# Patient Record
Sex: FEMALE | Race: WHITE | Employment: STUDENT | URBAN - METROPOLITAN AREA
[De-identification: names, ages, dates, MRNs, and addresses within clinical notes are randomized per-mention and may not be internally consistent; named-entity substitution may affect disease eponyms.]

---

## 2019-03-12 ENCOUNTER — HOSPITAL ENCOUNTER (OUTPATIENT)
Dept: PHYSICAL THERAPY | Age: 19
Setting detail: THERAPIES SERIES
Discharge: HOME | End: 2019-03-12
Payer: COMMERCIAL

## 2019-03-12 PROCEDURE — 97161 PT EVAL LOW COMPLEX 20 MIN: CPT

## 2019-03-12 PROCEDURE — 97110 THERAPEUTIC EXERCISES: CPT

## 2019-03-15 ENCOUNTER — HOSPITAL ENCOUNTER (OUTPATIENT)
Dept: PHYSICAL THERAPY | Age: 19
Setting detail: THERAPIES SERIES
Discharge: HOME | End: 2019-03-15
Payer: COMMERCIAL

## 2019-03-15 PROCEDURE — 97140 MANUAL THERAPY 1/> REGIONS: CPT

## 2019-03-15 PROCEDURE — 97110 THERAPEUTIC EXERCISES: CPT

## 2019-03-15 PROCEDURE — 97112 NEUROMUSCULAR REEDUCATION: CPT

## 2019-04-09 ENCOUNTER — HOSPITAL ENCOUNTER (OUTPATIENT)
Dept: PHYSICAL THERAPY | Age: 19
Setting detail: THERAPIES SERIES
Discharge: HOME | End: 2019-04-09
Payer: COMMERCIAL

## 2019-04-09 PROCEDURE — 97110 THERAPEUTIC EXERCISES: CPT

## 2019-04-09 PROCEDURE — 97112 NEUROMUSCULAR REEDUCATION: CPT

## 2019-04-09 PROCEDURE — 97140 MANUAL THERAPY 1/> REGIONS: CPT

## 2019-04-09 NOTE — FLOWSHEET NOTE
[facility] [address] [address]  Phone: [phone] Fax: [phone]    Physical Therapy Daily Treatment Note  Date:  2019    Patient Name:  Alcon Zheng"   :  2000  MRN: [id number]  Restrictions/Precautions:    Medical/Treatment Diagnosis Information:  · Diagnosis: L ITBFS  · Treatment Diagnosis: Y75.082  Insurance/Certification information:  PT Insurance Information: [employer]/[place]  Physician Information:  Referring Practitioner: Smith Byrne of care signed (Y/N):     Date of Patient follow up with Physician:     G-Code (if applicable):      Date G-Code Applied:         Progress Note: [x]  Yes  []  No  Next due by: Visit #10       Latex Allergy:  [x]NO      []YES  Preferred Language for Healthcare:   [x]English       []other:    Visit # Insurance Allowable   3 MU     Pain level:  3/10     SUBJECTIVE:  Did well but games this weekend made it sore again. Both lateral quads and ITB.      OBJECTIVE: See eval  Observation:   Test measurements:      RESTRICTIONS/PRECAUTIONS:     Exercises/Interventions:     Therapeutic Ex (29857) 15 Sets/sec Reps Notes/CUES   Retro Stepper/BIKE      Alter G      BFR      Sportcord March   Slow- control in SLS- lat   3 way SLR      SAQ      Clam ABD 2 15    Hip Ext /table  15    BOSU fwd/side lunge   shaky   BOSU squat      Leg Press Iso/Con/Ecc 0-      Cybex HS curl      TKE      Glute side walks 3 15 maroon   RDL 2 12 Green kb, 1 set each leg   Slide Lunge      Slide HS eccentrics      Step ups/ecc step down      Swissball wall rolls- in SLS- hip drive      Quad hip ext/wall-ball rolls      pigeon 2 15          Manual Intervention (96674) x 16 min      Knee mobs/PROM      Tib/Fem Mobs      Patella Mobs      Quad stretching  6    ITB mobs  10          NMR re-education (21718) x 9 min   CUES NEEDED   Palestinian/Biofeedback 10/10      BFR      G. Med activation 5s 10 Standing @ wall         Bosu Bal and Prop- G Med      Single leg stance/Balance/Prop      Bosu Retro G. Med act 10 10    Prone Hip froggers- sliders/elevated 10 10          Therapeutic Activity (14368)      Ladders      Plyos      Dynamic Balance                            Therapeutic Exercise and NMR EXR  [x] (02297) Provided verbal/tactile cueing for activities related to strengthening, flexibility, endurance, ROM for improvements in LE, proximal hip, and core control with self care, mobility, lifting, ambulation. [x] (65825) Provided verbal/tactile cueing for activities related to improving balance, coordination, kinesthetic sense, posture, motor skill, proprioception  to assist with LE, proximal hip, and core control in self care, mobility, lifting, ambulation and eccentric single leg control.      NMR and Therapeutic Activities:    [] (09361 or 04855) Provided verbal/tactile cueing for activities related to improving balance, coordination, kinesthetic sense, posture, motor skill, proprioception and motor activation to allow for proper function of core, proximal hip and LE with self care and ADLs and functional mobility.   [] (70695) Gait Re-education- Provided training and instruction to the patient for proper LE, core and proximal hip recruitment and positioning and eccentric body weight control with ambulation re-education including up and down stairs     Home Exercise Program:    [x] (31850) Reviewed/Progressed HEP activities related to strengthening, flexibility, endurance, ROM of core, proximal hip and LE for functional self-care, mobility, lifting and ambulation/stair navigation   [] (70862)Reviewed/Progressed HEP activities related to improving balance, coordination, kinesthetic sense, posture, motor skill, proprioception of core, proximal hip and LE for self care, mobility, lifting, and ambulation/stair navigation      Manual Treatments:  PROM / STM / Oscillations-Mobs:  G-I, II, III, IV (PA's, Inf., Post.)  [x] progressing as expected towards functional goals listed. [] Progression is slowed due to complexities listed. [] Progression has been slowed due to co-morbidities. [] Plan just implemented, too soon to assess goals progression  [] Other:     ASSESSMENT:  Significant tenderness in B lateral quad and ITB. Return to Play: (if applicable)   []  Stage 1: Intro to Strength   []  Stage 2: Return to Run and Strength   []  Stage 3: Return to Jump and Strength   []  Stage 4: Dynamic Strength and Agility   []  Stage 5: Sport Specific Training     []  Ready to Return to Play, Meets All Above Stages   []  Not Ready for Return to Sports   Comments:                         Treatment/Activity Tolerance:  [x] Patient tolerated treatment well [] Patient limited by fatique  [] Patient limited by pain  [] Patient limited by other medical complications  [] Other:     Prognosis: [x] Good [] Fair  [] Poor    Patient Requires Follow-up: [x] Yes  [] No      PLAN: Manual prior to games.    [x] Continue per plan of care [] Alter current plan (see comments)  [] Plan of care initiated [] Hold pending MD visit [] Discharge    Electronically signed by: Puneet Keys PT

## 2019-04-12 ENCOUNTER — HOSPITAL ENCOUNTER (OUTPATIENT)
Dept: PHYSICAL THERAPY | Age: 19
Setting detail: THERAPIES SERIES
Discharge: HOME | End: 2019-04-12
Payer: COMMERCIAL

## 2019-04-12 PROCEDURE — 97140 MANUAL THERAPY 1/> REGIONS: CPT

## 2019-04-12 PROCEDURE — 97110 THERAPEUTIC EXERCISES: CPT

## 2019-04-12 NOTE — FLOWSHEET NOTE
Bosu Bal and Prop- G Med      Single leg stance/Balance/Prop      Bosu Retro G. Med act 10 10    Prone Hip froggers- sliders/elevated 10 10          Therapeutic Activity (15383)      Ladders      Plyos      Dynamic Balance                            Therapeutic Exercise and NMR EXR  [x] (92829) Provided verbal/tactile cueing for activities related to strengthening, flexibility, endurance, ROM for improvements in LE, proximal hip, and core control with self care, mobility, lifting, ambulation. [x] (38051) Provided verbal/tactile cueing for activities related to improving balance, coordination, kinesthetic sense, posture, motor skill, proprioception  to assist with LE, proximal hip, and core control in self care, mobility, lifting, ambulation and eccentric single leg control.      NMR and Therapeutic Activities:    [] (97873 or 98505) Provided verbal/tactile cueing for activities related to improving balance, coordination, kinesthetic sense, posture, motor skill, proprioception and motor activation to allow for proper function of core, proximal hip and LE with self care and ADLs and functional mobility.   [] (64817) Gait Re-education- Provided training and instruction to the patient for proper LE, core and proximal hip recruitment and positioning and eccentric body weight control with ambulation re-education including up and down stairs     Home Exercise Program:    [x] (66082) Reviewed/Progressed HEP activities related to strengthening, flexibility, endurance, ROM of core, proximal hip and LE for functional self-care, mobility, lifting and ambulation/stair navigation   [] (29519)Reviewed/Progressed HEP activities related to improving balance, coordination, kinesthetic sense, posture, motor skill, proprioception of core, proximal hip and LE for self care, mobility, lifting, and ambulation/stair navigation      Manual Treatments:  PROM / STM / Oscillations-Mobs:  G-I, II, III, IV (PA's, Inf., Post.)  [x] (35325) Provided manual therapy to mobilize LE, proximal hip and/or LS spine soft tissue/joints for the purpose of modulating pain, promoting relaxation,  increasing ROM, reducing/eliminating soft tissue swelling/inflammation/restriction, improving soft tissue extensibility and allowing for proper ROM for normal function with self care, mobility, lifting and ambulation. Modalities:     [] GAME READY (VASO)- for significant edema, swelling, pain control. Charges:  Timed Code Treatment Minutes: 31   Total Treatment Minutes: 31      [] EVAL (LOW) 55266 (typically 20 minutes face-to-face)  [] EVAL (MOD) 98345 (typically 30 minutes face-to-face)  [] EVAL (HIGH) 24220 (typically 45 minutes face-to-face)  [] RE-EVAL      [x] RT(80417) x  1   [] IONTO  [] NMR (66489) x      [] VASO  [x] Manual (97454) x  1    [] Other:  [] TA x       [] Mech Traction (11398)  [] ES(attended) (26565)      [] ES (un) (44224):     GOALS:  Patient stated goal: pain free FH    Therapist goals for Patient:   Short Term Goals: To be achieved in: 2 weeks  1. Independent in HEP and progression per patient tolerance, in order to prevent re-injury. 2. Patient will have a decrease in pain to facilitate improvement in movement, function, and ADLs as indicated by Functional Deficits. Long Term Goals: To be achieved in: 4 weeks  1. Disability index score of 2% or less for the LEFS to assist with reaching prior level of function. 2. Patient will demonstrate increased AROM to WNL to allow for proper joint functioning as indicated by patients Functional Deficits. 3. Patient will demonstrate an increase in Strength to good proximal hip strength and control, within 5lb HHD in LE to allow for proper functional mobility as indicated by patients Functional Deficits. 4. Patient will return to single leg, eccentric functional activities without increased symptoms or restriction.        Progression Towards Functional goals:  [x] Patient is progressing as expected towards functional goals listed. [] Progression is slowed due to complexities listed. [] Progression has been slowed due to co-morbidities. [] Plan just implemented, too soon to assess goals progression  [] Other:     ASSESSMENT:  Significant tenderness in HS, lateral quad and ITB. Better after manual. May have slight LS component. Return to Play: (if applicable)   []  Stage 1: Intro to Strength   []  Stage 2: Return to Run and Strength   []  Stage 3: Return to Jump and Strength   []  Stage 4: Dynamic Strength and Agility   []  Stage 5: Sport Specific Training     []  Ready to Return to Play, Meets All Above Stages   []  Not Ready for Return to Sports   Comments:                         Treatment/Activity Tolerance:  [x] Patient tolerated treatment well [] Patient limited by fatique  [] Patient limited by pain  [] Patient limited by other medical complications  [] Other:     Prognosis: [x] Good [] Fair  [] Poor    Patient Requires Follow-up: [x] Yes  [] No      PLAN: Manual prior to games.    [x] Continue per plan of care [] Alter current plan (see comments)  [] Plan of care initiated [] Hold pending MD visit [] Discharge    Electronically signed by: Jenn Ceballos, PT

## 2019-04-15 ENCOUNTER — APPOINTMENT (OUTPATIENT)
Dept: PHYSICAL THERAPY | Age: 19
End: 2019-04-15
Payer: COMMERCIAL

## 2019-04-16 ENCOUNTER — HOSPITAL ENCOUNTER (OUTPATIENT)
Dept: PHYSICAL THERAPY | Age: 19
Setting detail: THERAPIES SERIES
Discharge: HOME | End: 2019-04-16
Payer: COMMERCIAL

## 2019-04-16 PROCEDURE — 97110 THERAPEUTIC EXERCISES: CPT

## 2019-04-16 PROCEDURE — 97140 MANUAL THERAPY 1/> REGIONS: CPT

## 2019-04-16 PROCEDURE — 97032 APPL MODALITY 1+ESTIM EA 15: CPT

## 2019-04-16 PROCEDURE — 97112 NEUROMUSCULAR REEDUCATION: CPT

## 2019-04-16 NOTE — FLOWSHEET NOTE
activation 5s 10 Standing @ wall         Bosu Bal and Prop- G Med      Single leg stance/Balance/Prop      Bosu Retro G. Med act 10 10    Prone Hip froggers- sliders/elevated 10 10          Therapeutic Activity (31750)      Ladders      Plyos      Dynamic Balance                            Therapeutic Exercise and NMR EXR  [x] (11091) Provided verbal/tactile cueing for activities related to strengthening, flexibility, endurance, ROM for improvements in LE, proximal hip, and core control with self care, mobility, lifting, ambulation. [x] (64683) Provided verbal/tactile cueing for activities related to improving balance, coordination, kinesthetic sense, posture, motor skill, proprioception  to assist with LE, proximal hip, and core control in self care, mobility, lifting, ambulation and eccentric single leg control.      NMR and Therapeutic Activities:    [] (61976 or 35952) Provided verbal/tactile cueing for activities related to improving balance, coordination, kinesthetic sense, posture, motor skill, proprioception and motor activation to allow for proper function of core, proximal hip and LE with self care and ADLs and functional mobility.   [] (35832) Gait Re-education- Provided training and instruction to the patient for proper LE, core and proximal hip recruitment and positioning and eccentric body weight control with ambulation re-education including up and down stairs     Home Exercise Program:    [x] (53493) Reviewed/Progressed HEP activities related to strengthening, flexibility, endurance, ROM of core, proximal hip and LE for functional self-care, mobility, lifting and ambulation/stair navigation   [] (85153)Reviewed/Progressed HEP activities related to improving balance, coordination, kinesthetic sense, posture, motor skill, proprioception of core, proximal hip and LE for self care, mobility, lifting, and ambulation/stair navigation      Manual Treatments:  PROM / STM / Oscillations-Mobs:  G-I, II, III, IV (PA's, Inf., Post.)  [x] (15596) Provided manual therapy to mobilize LE, proximal hip and/or LS spine soft tissue/joints for the purpose of modulating pain, promoting relaxation,  increasing ROM, reducing/eliminating soft tissue swelling/inflammation/restriction, improving soft tissue extensibility and allowing for proper ROM for normal function with self care, mobility, lifting and ambulation. Spoke with   regarding the use of Dry Needling     Dry needling manual therapy: consisted on the placement of 5 needles in the following muscles:  lat quad, ITB. A 50 mm needle was inserted, piston, rotated, and coned to produce intramuscular mobilization. These techniques were used to restore functional range of motion, reduce muscle spasm and induce healing in the corresponding musculature. (09691)  Clean Technique was utilized today while applying Dry needling treatment. The treatment sites where cleaned with 70% solution of  isopropyl alcohol . The PT washed their hands and utilized treatment gloves along with hand  prior to inserting the needles. All needles where removed and discarded in the appropriate sharps container. MD has given verbal and/or written approval for this treatment. Attended low frequency (1-20Hz) electrical stimulation was utilized in conjunction with Dry Needling:  the Estim was manipulated between all above needles for a period of 10 min. at 4 volts. The low frequency electrical stimulation was used to help reduce muscle spasm and help to interrupt /Milton the pain cycle. (03363)     Modalities:     [] GAME READY (VASO)- for significant edema, swelling, pain control.      Charges:  Timed Code Treatment Minutes: 48   Total Treatment Minutes: 48      [] EVAL (LOW) 88205 (typically 20 minutes face-to-face)  [] EVAL (MOD) 66908 (typically 30 minutes face-to-face)  [] EVAL (HIGH) 77195 (typically 45 minutes face-to-face)  [] RE-EVAL      [x] PH(81347) x  1   [] IONTO  [x]

## 2019-04-18 ENCOUNTER — HOSPITAL ENCOUNTER (OUTPATIENT)
Dept: PHYSICAL THERAPY | Age: 19
Setting detail: THERAPIES SERIES
Discharge: HOME | End: 2019-04-18
Payer: COMMERCIAL

## 2019-04-18 PROCEDURE — 97112 NEUROMUSCULAR REEDUCATION: CPT

## 2019-04-18 PROCEDURE — 97140 MANUAL THERAPY 1/> REGIONS: CPT

## 2019-04-18 PROCEDURE — 97110 THERAPEUTIC EXERCISES: CPT

## 2019-04-18 NOTE — FLOWSHEET NOTE
5s 10 Standing @ wall         Bosu Bal and Prop- G Med      Single leg stance/Balance/Prop      Bosu Retro G. Med act 10 10    Prone Hip froggers- sliders/elevated 10 10          Therapeutic Activity (15452)      Ladders      Plyos      Dynamic Balance                            Therapeutic Exercise and NMR EXR  [x] (16231) Provided verbal/tactile cueing for activities related to strengthening, flexibility, endurance, ROM for improvements in LE, proximal hip, and core control with self care, mobility, lifting, ambulation. [x] (77991) Provided verbal/tactile cueing for activities related to improving balance, coordination, kinesthetic sense, posture, motor skill, proprioception  to assist with LE, proximal hip, and core control in self care, mobility, lifting, ambulation and eccentric single leg control.      NMR and Therapeutic Activities:    [] (53465 or 76857) Provided verbal/tactile cueing for activities related to improving balance, coordination, kinesthetic sense, posture, motor skill, proprioception and motor activation to allow for proper function of core, proximal hip and LE with self care and ADLs and functional mobility.   [] (03323) Gait Re-education- Provided training and instruction to the patient for proper LE, core and proximal hip recruitment and positioning and eccentric body weight control with ambulation re-education including up and down stairs     Home Exercise Program:    [x] (31491) Reviewed/Progressed HEP activities related to strengthening, flexibility, endurance, ROM of core, proximal hip and LE for functional self-care, mobility, lifting and ambulation/stair navigation   [] (84944)Reviewed/Progressed HEP activities related to improving balance, coordination, kinesthetic sense, posture, motor skill, proprioception of core, proximal hip and LE for self care, mobility, lifting, and ambulation/stair navigation      Manual Treatments:  PROM / STM / Oscillations-Mobs:  G-I, II, III, IV (PA's, Patient is progressing as expected towards functional goals listed. [] Progression is slowed due to complexities listed. [] Progression has been slowed due to co-morbidities. [] Plan just implemented, too soon to assess goals progression  [] Other:     ASSESSMENT:  Continue core ex, prox hip ex. Return to Play: (if applicable)   []  Stage 1: Intro to Strength   []  Stage 2: Return to Run and Strength   []  Stage 3: Return to Jump and Strength   []  Stage 4: Dynamic Strength and Agility   []  Stage 5: Sport Specific Training     []  Ready to Return to Play, Meets All Above Stages   []  Not Ready for Return to Sports   Comments:                         Treatment/Activity Tolerance:  [x] Patient tolerated treatment well [] Patient limited by fatique  [] Patient limited by pain  [] Patient limited by other medical complications  [] Other:     Prognosis: [x] Good [] Fair  [] Poor    Patient Requires Follow-up: [x] Yes  [] No      PLAN: Manual prior to games, continue focus on glute/prox hip.    [x] Continue per plan of care [] Alter current plan (see comments)  [] Plan of care initiated [] Hold pending MD visit [] Discharge    Electronically signed by: Rainell Hodgkin, PT

## 2019-08-13 ENCOUNTER — NURSE ONLY (OUTPATIENT)
Dept: CARDIOLOGY CLINIC | Age: 19
End: 2019-08-13
Payer: COMMERCIAL

## 2019-08-13 DIAGNOSIS — Z02.5 SPORTS PHYSICAL: Primary | ICD-10-CM

## 2019-08-13 PROCEDURE — 93000 ELECTROCARDIOGRAM COMPLETE: CPT | Performed by: INTERNAL MEDICINE

## 2019-12-03 ENCOUNTER — OFFICE VISIT (OUTPATIENT)
Dept: ORTHOPEDIC SURGERY | Age: 19
End: 2019-12-03
Payer: COMMERCIAL

## 2019-12-03 ENCOUNTER — HOSPITAL ENCOUNTER (OUTPATIENT)
Dept: OCCUPATIONAL THERAPY | Age: 19
Setting detail: THERAPIES SERIES
Discharge: HOME | End: 2019-12-03
Payer: COMMERCIAL

## 2019-12-03 VITALS — BODY MASS INDEX: 19.62 KG/M2 | HEIGHT: 67 IN | WEIGHT: 125 LBS

## 2019-12-03 DIAGNOSIS — S62.620A CLOSED DISPLACED FRACTURE OF MIDDLE PHALANX OF RIGHT INDEX FINGER, INITIAL ENCOUNTER: ICD-10-CM

## 2019-12-03 DIAGNOSIS — S69.91XA FINGER INJURY, RIGHT, INITIAL ENCOUNTER: Primary | ICD-10-CM

## 2019-12-03 PROCEDURE — L3933 FO W/O JOINTS CF: HCPCS | Performed by: OCCUPATIONAL THERAPIST

## 2019-12-03 PROCEDURE — 99203 OFFICE O/P NEW LOW 30 MIN: CPT | Performed by: ORTHOPAEDIC SURGERY

## 2019-12-03 RX ORDER — TRIAMCINOLONE ACETONIDE 55 UG/1
2 SPRAY, METERED NASAL
COMMUNITY

## 2019-12-03 RX ORDER — MONTELUKAST SODIUM 10 MG/1
TABLET ORAL
Refills: 4 | COMMUNITY
Start: 2019-11-14

## 2019-12-03 RX ORDER — LEVALBUTEROL TARTRATE 45 UG/1
1-2 AEROSOL, METERED ORAL
COMMUNITY

## 2019-12-12 ENCOUNTER — OFFICE VISIT (OUTPATIENT)
Dept: ORTHOPEDIC SURGERY | Age: 19
End: 2019-12-12
Payer: COMMERCIAL

## 2019-12-12 VITALS — WEIGHT: 125 LBS | HEIGHT: 67 IN | BODY MASS INDEX: 19.62 KG/M2

## 2019-12-12 DIAGNOSIS — S62.620A CLOSED DISPLACED FRACTURE OF MIDDLE PHALANX OF RIGHT INDEX FINGER, INITIAL ENCOUNTER: Primary | ICD-10-CM

## 2019-12-12 DIAGNOSIS — S69.91XA FINGER INJURY, RIGHT, INITIAL ENCOUNTER: ICD-10-CM

## 2019-12-12 PROCEDURE — 99213 OFFICE O/P EST LOW 20 MIN: CPT | Performed by: ORTHOPAEDIC SURGERY

## 2021-11-15 ENCOUNTER — OFFICE VISIT (OUTPATIENT)
Dept: INTERNAL MEDICINE | Facility: CLINIC | Age: 21
End: 2021-11-15

## 2021-11-15 VITALS
TEMPERATURE: 98 F | OXYGEN SATURATION: 100 % | DIASTOLIC BLOOD PRESSURE: 68 MMHG | BODY MASS INDEX: 19.13 KG/M2 | HEART RATE: 76 BPM | WEIGHT: 121.9 LBS | HEIGHT: 67 IN | SYSTOLIC BLOOD PRESSURE: 114 MMHG

## 2021-11-15 DIAGNOSIS — Z23 NEED FOR INFLUENZA VACCINATION: ICD-10-CM

## 2021-11-15 DIAGNOSIS — Z00.00 HEALTH CARE MAINTENANCE: Primary | ICD-10-CM

## 2021-11-15 DIAGNOSIS — F41.9 ANXIETY: ICD-10-CM

## 2021-11-15 PROCEDURE — 90686 IIV4 VACC NO PRSV 0.5 ML IM: CPT | Performed by: INTERNAL MEDICINE

## 2021-11-15 PROCEDURE — 99385 PREV VISIT NEW AGE 18-39: CPT | Performed by: INTERNAL MEDICINE

## 2021-11-15 PROCEDURE — 90471 IMMUNIZATION ADMIN: CPT | Performed by: INTERNAL MEDICINE

## 2021-11-15 RX ORDER — LEVALBUTEROL TARTRATE 45 UG/1
1-2 AEROSOL, METERED ORAL DAILY PRN
COMMUNITY

## 2021-11-15 RX ORDER — TRIAMCINOLONE ACETONIDE 55 UG/1
2 SPRAY, METERED NASAL DAILY
COMMUNITY

## 2021-11-15 RX ORDER — MOMETASONE FUROATE 50 UG/1
2 SPRAY, METERED NASAL DAILY
COMMUNITY
End: 2021-11-15

## 2021-11-15 RX ORDER — HYDROXYZINE HYDROCHLORIDE 10 MG/1
10 TABLET, FILM COATED ORAL 3 TIMES DAILY PRN
Qty: 30 TABLET | Refills: 0 | Status: SHIPPED | OUTPATIENT
Start: 2021-11-15

## 2021-11-15 RX ORDER — DIPHENOXYLATE HYDROCHLORIDE AND ATROPINE SULFATE 2.5; .025 MG/1; MG/1
1 TABLET ORAL DAILY
COMMUNITY
End: 2022-11-17

## 2021-11-15 NOTE — PROGRESS NOTES
"Jessica Barone is a 21 y.o. female and is here for a comprehensive physical exam. The patient reports problems - wellcontrolled asthma.    Pt is UTD with annual gyn exam and mammo make a referral      Do you take any herbs or supplements that were not prescribed by a doctor?       Social History: she does exercise reg  Social History     Socioeconomic History   • Marital status: Single   Tobacco Use   • Smoking status: Never Smoker   • Smokeless tobacco: Never Used   Substance and Sexual Activity   • Alcohol use: Yes     Comment: social       Family History: No family history on file.    Past Medical History:   Past Medical History:   Diagnosis Date   • Asthma            Review of Systems    A comprehensive review of systems was negative.    Objective   /68 (BP Location: Left arm, Patient Position: Sitting)   Pulse 76   Temp 98 °F (36.7 °C) (Infrared)   Ht 170.2 cm (67\")   Wt 55.3 kg (121 lb 14.4 oz)   SpO2 100%   BMI 19.09 kg/m²     General Appearance:    Alert, cooperative, no distress, appears stated age   Head:    Normocephalic, without obvious abnormality, atraumatic   Eyes:    PERRL, conjunctiva/corneas clear, EOM's intact, fundi     benign, both eyes   Ears:    Normal TM's and external ear canals, both ears   Nose:   Nares normal, septum midline, mucosa normal, no drainage    or sinus tenderness   Throat:   Lips, mucosa, and tongue normal; teeth and gums normal   Neck:   Supple, symmetrical, trachea midline, no adenopathy;     thyroid:  no enlargement/tenderness/nodules; no carotid    bruit or JVD   Back:     Symmetric, no curvature, ROM normal, no CVA tenderness   Lungs:     Clear to auscultation bilaterally, respirations unlabored   Chest Wall:    No tenderness or deformity    Heart:    Regular rate and rhythm, S1 and S2 normal, no murmur, rub   or gallop   Breast Exam:    No tenderness, masses, or nipple abnormality   Abdomen:     Soft, non-tender, bowel sounds active all four " quadrants,     no masses, no organomegaly           Extremities:   Extremities normal, atraumatic, no cyanosis or edema   Pulses:   2+ and symmetric all extremities   Skin:   Skin color, texture, turgor normal, no rashes or lesions   Lymph nodes:   Cervical, supraclavicular, and axillary nodes normal   Neurologic:   CNII-XII intact, normal strength, sensation and reflexes     throughout       Vitals:    11/15/21 0811   BP: 114/68   Pulse: 76   Temp: 98 °F (36.7 °C)   SpO2: 100%     Body mass index is 19.09 kg/m².      Medications:   Current Outpatient Medications:   •  beclomethasone (QVAR) 80 MCG/ACT inhaler, Inhale 1 puff Daily As Needed., Disp: , Rfl:   •  levalbuterol (XOPENEX HFA) 45 MCG/ACT inhaler, Inhale 1-2 puffs Daily As Needed., Disp: , Rfl:   •  multivitamin (multivitamin) tablet tablet, Take 1 tablet by mouth Daily., Disp: , Rfl:   •  Triamcinolone Acetonide (NASACORT) 55 MCG/ACT nasal inhaler, 2 sprays into the nostril(s) as directed by provider Daily., Disp: , Rfl:        Assessment/Plan   Healthy female exam.      1. Healthcare Maintenance:  2. Patient Counseling:  --Nutrition: Stressed importance of moderation in sodium/caffeine intake, saturated fat and cholesterol, caloric balance, sufficient intake of fresh fruits, vegetables, fiber, calcium and vit D  --Exercise: she does exercise  --Substance Abuse: no tob occas etoh  --Dental health: she does go to the dentist reg  --Immunizations reviewed.utd  --Discussed benefits of screening colonoscopy.    3.  Exercise induced asthma-  Se is stable with prn use

## 2021-11-16 LAB
25(OH)D3+25(OH)D2 SERPL-MCNC: 39 NG/ML (ref 30–100)
ALBUMIN SERPL-MCNC: 4.9 G/DL (ref 3.9–5)
ALBUMIN/GLOB SERPL: 1.8 {RATIO} (ref 1.2–2.2)
ALP SERPL-CCNC: 36 IU/L (ref 44–121)
ALT SERPL-CCNC: 10 IU/L (ref 0–32)
AST SERPL-CCNC: 19 IU/L (ref 0–40)
BASOPHILS # BLD AUTO: 0 X10E3/UL (ref 0–0.2)
BASOPHILS NFR BLD AUTO: 1 %
BILIRUB SERPL-MCNC: 0.6 MG/DL (ref 0–1.2)
BUN SERPL-MCNC: 10 MG/DL (ref 6–20)
BUN/CREAT SERPL: 14 (ref 9–23)
CALCIUM SERPL-MCNC: 9.9 MG/DL (ref 8.7–10.2)
CHLORIDE SERPL-SCNC: 102 MMOL/L (ref 96–106)
CHOLEST SERPL-MCNC: 143 MG/DL (ref 100–199)
CO2 SERPL-SCNC: 22 MMOL/L (ref 20–29)
CREAT SERPL-MCNC: 0.7 MG/DL (ref 0.57–1)
EOSINOPHIL # BLD AUTO: 0 X10E3/UL (ref 0–0.4)
EOSINOPHIL NFR BLD AUTO: 1 %
ERYTHROCYTE [DISTWIDTH] IN BLOOD BY AUTOMATED COUNT: 11.8 % (ref 11.7–15.4)
GLOBULIN SER CALC-MCNC: 2.7 G/DL (ref 1.5–4.5)
GLUCOSE SERPL-MCNC: 91 MG/DL (ref 65–99)
HCT VFR BLD AUTO: 39.3 % (ref 34–46.6)
HCV AB S/CO SERPL IA: <0.1 S/CO RATIO (ref 0–0.9)
HDLC SERPL-MCNC: 59 MG/DL
HGB BLD-MCNC: 13.1 G/DL (ref 11.1–15.9)
IMM GRANULOCYTES # BLD AUTO: 0 X10E3/UL (ref 0–0.1)
IMM GRANULOCYTES NFR BLD AUTO: 0 %
LDLC SERPL CALC-MCNC: 72 MG/DL (ref 0–99)
LDLC/HDLC SERPL: 1.2 RATIO (ref 0–3.2)
LYMPHOCYTES # BLD AUTO: 1.3 X10E3/UL (ref 0.7–3.1)
LYMPHOCYTES NFR BLD AUTO: 33 %
MCH RBC QN AUTO: 28.8 PG (ref 26.6–33)
MCHC RBC AUTO-ENTMCNC: 33.3 G/DL (ref 31.5–35.7)
MCV RBC AUTO: 86 FL (ref 79–97)
MONOCYTES # BLD AUTO: 0.5 X10E3/UL (ref 0.1–0.9)
MONOCYTES NFR BLD AUTO: 12 %
NEUTROPHILS # BLD AUTO: 2.2 X10E3/UL (ref 1.4–7)
NEUTROPHILS NFR BLD AUTO: 53 %
PLATELET # BLD AUTO: 241 X10E3/UL (ref 150–450)
POTASSIUM SERPL-SCNC: 4.2 MMOL/L (ref 3.5–5.2)
PROT SERPL-MCNC: 7.6 G/DL (ref 6–8.5)
RBC # BLD AUTO: 4.55 X10E6/UL (ref 3.77–5.28)
SODIUM SERPL-SCNC: 139 MMOL/L (ref 134–144)
TRIGL SERPL-MCNC: 55 MG/DL (ref 0–149)
TSH SERPL DL<=0.005 MIU/L-ACNC: 3.94 UIU/ML (ref 0.45–4.5)
VLDLC SERPL CALC-MCNC: 12 MG/DL (ref 5–40)
WBC # BLD AUTO: 4 X10E3/UL (ref 3.4–10.8)

## 2022-01-10 ENCOUNTER — TELEPHONE (OUTPATIENT)
Dept: INTERNAL MEDICINE | Facility: CLINIC | Age: 22
End: 2022-01-10

## 2022-01-10 NOTE — TELEPHONE ENCOUNTER
Caller: Charmaine Barone    Relationship: Self    Best call back number: [phone] (H)    What is the best time to reach you: AFTER LUNCH 11-1:30 PM    Who are you requesting to speak with (clinical staff, provider,  specific staff member): CLINICAL STAFF    Do you know the name of the person who called:     What was the call regarding: HAS SOME QUESTIONS ABOUT COVID- HOW LONG WILL BE CONTAGIOUS- IS THERE ANY MEDICATION TO SHORTEN THE VIRUS IN HER BODY.     Do you require a callback: YES        THANKS

## 2022-01-10 NOTE — TELEPHONE ENCOUNTER
Pt advised to quarantine for 10 days of onset of symtpoms. Is anything becomes uncontrollable to call for an appt or seek help at the ER

## 2022-05-19 ENCOUNTER — PATIENT ROUNDING (BHMG ONLY) (OUTPATIENT)
Dept: OBSTETRICS AND GYNECOLOGY | Age: 22
End: 2022-05-19

## 2022-05-19 ENCOUNTER — OFFICE VISIT (OUTPATIENT)
Dept: OBSTETRICS AND GYNECOLOGY | Age: 22
End: 2022-05-19

## 2022-05-19 VITALS
DIASTOLIC BLOOD PRESSURE: 62 MMHG | WEIGHT: 122 LBS | HEIGHT: 67 IN | BODY MASS INDEX: 19.15 KG/M2 | SYSTOLIC BLOOD PRESSURE: 108 MMHG

## 2022-05-19 DIAGNOSIS — Z01.419 ENCOUNTER FOR GYNECOLOGICAL EXAMINATION WITHOUT ABNORMAL FINDING: Primary | ICD-10-CM

## 2022-05-19 DIAGNOSIS — Z12.4 SCREENING FOR CERVICAL CANCER: ICD-10-CM

## 2022-05-19 DIAGNOSIS — N94.6 DYSMENORRHEA: ICD-10-CM

## 2022-05-19 PROCEDURE — 99385 PREV VISIT NEW AGE 18-39: CPT | Performed by: OBSTETRICS & GYNECOLOGY

## 2022-05-19 RX ORDER — NORETHINDRONE ACETATE AND ETHINYL ESTRADIOL AND FERROUS FUMARATE 1MG-20(24)
1 KIT ORAL DAILY
Qty: 84 TABLET | Refills: 3 | Status: SHIPPED | OUTPATIENT
Start: 2022-05-19 | End: 2023-03-28

## 2022-05-19 RX ORDER — AZELASTINE 1 MG/ML
SPRAY, METERED NASAL
COMMUNITY
Start: 2022-04-26 | End: 2022-05-19 | Stop reason: SDUPTHER

## 2022-05-19 RX ORDER — FEXOFENADINE HYDROCHLORIDE AND PSEUDOEPHEDRINE HYDROCHLORIDE 180; 240 MG/1; MG/1
1 TABLET, FILM COATED, EXTENDED RELEASE ORAL DAILY
COMMUNITY
Start: 2022-04-26 | End: 2022-11-17

## 2022-05-19 NOTE — PROGRESS NOTES
A MY CHART MESSAGE HAS BEEN SENT TO THE [place]IENT [place] [facility] [place]

## 2022-05-19 NOTE — PROGRESS NOTES
Routine Annual Visit    2022    Patient: Charmaine Barone          MR#:[id number]      Chief Complaint   Patient presents with   • Gynecologic Exam     New GYN, No Pap on file       History of Present Illness    21 y.o. female  who presents for annual exam.   New pt, see written gyn  Regular cycles with moderate cramping  Never SA  Got gardasil  Works as sale rep for migraine [employer]  Getting  in August   Wants BCM- discussed ocp , LAC etc  Pt wants to try ocp          Patient's last menstrual period was 2022 (exact date).  Obstetric History:  OB History        0    Para   0    Term   0       0    AB   0    Living   0       SAB   0    IAB   0    Ectopic   0    Molar   0    Multiple   0    Live Births   0               Menstrual History:     Patient's last menstrual period was 2022 (exact date).       Sexual History:       ________________________________________  There is no problem list on file for this patient.      Past Medical History:   Diagnosis Date   • Asthma    • PONV (postoperative nausea and vomiting)        Past Surgical History:   Procedure Laterality Date   • WISDOM TOOTH EXTRACTION  2018       Social History     Tobacco Use   Smoking Status Never Smoker   Smokeless Tobacco Never Used       has a current medication list which includes the following prescription(s): allegra-d allergy & congestion, beclomethasone, hydroxyzine, levalbuterol, multivitamin, triamcinolone acetonide, and norethindrone-ethinyl estradiol-ferrous fumarate.  ________________________________________    Current contraception: abstinence  History of abnormal Pap smear: no  Family history of Breast cancer: no        The following portions of the patient's history were reviewed and updated as appropriate: allergies, current medications, past family history, past medical history, past social history, past surgical history and problem list.    Review of Systems    Pertinent items are noted in  "HPI.     Objective   Physical Exam    /62   Ht 170.2 cm (67\")   Wt 55.3 kg (122 lb)   LMP 05/08/2022 (Exact Date)   Breastfeeding No   BMI 19.11 kg/m²    BP Readings from Last 3 Encounters:   05/19/22 108/62   11/15/21 114/68      Wt Readings from Last 3 Encounters:   05/19/22 55.3 kg (122 lb)   11/15/21 55.3 kg (121 lb 14.4 oz)      BMI: Estimated body mass index is 19.11 kg/m² as calculated from the following:    Height as of this encounter: 170.2 cm (67\").    Weight as of this encounter: 55.3 kg (122 lb).      General:   alert, appears stated age and cooperative   Abdomen: soft, non-tender, without masses or organomegaly   Breast: inspection negative, no nipple discharge or bleeding, no masses or nodularity palpable   Vulva: normal   Vagina: normal mucosa   Cervix: no cervical motion tenderness and no lesions   Uterus: deferred   Adnexa: deferred     Assessment:    1. Normal annual exam   Assessment     ICD-10-CM ICD-9-CM   1. Encounter for gynecological examination without abnormal finding  Z01.419 V72.31   2. Dysmenorrhea  N94.6 625.3   3. Screening for cervical cancer  Z12.4 V76.2     Plan:    Plan       [x]  PAP done  []  Labs:   []  GC/Chl/TV          Diagnoses and all orders for this visit:    1. Encounter for gynecological examination without abnormal finding (Primary)    2. Dysmenorrhea    3. Screening for cervical cancer  -     IGP, Rfx Aptima HPV ASCU    Other orders  -     norethindrone-ethinyl estradiol-ferrous fumarate (LOESTIN 24 FE) 1-20 MG-MCG(24) per tablet; Take 1 tablet by mouth Daily.  Dispense: 84 tablet; Refill: 3        No STD check, never SA  Reviewed RBA and SE of OCPs    Counseling:  --Nutrition: Stressed importance of moderation and caloric balance, stressed fresh fruit and vegetables  --Exercise: Stressed the importance of regular exercise. 3-5 times weekly       --Discussed pap smear screening recommendations    "

## 2022-05-25 LAB
CONV .: NORMAL
CYTOLOGIST CVX/VAG CYTO: NORMAL
CYTOLOGY CVX/VAG DOC CYTO: NORMAL
CYTOLOGY CVX/VAG DOC THIN PREP: NORMAL
DX ICD CODE: NORMAL
HIV 1 & 2 AB SER-IMP: NORMAL
OTHER STN SPEC: NORMAL
STAT OF ADQ CVX/VAG CYTO-IMP: NORMAL

## 2022-07-28 ENCOUNTER — TELEPHONE (OUTPATIENT)
Dept: OBSTETRICS AND GYNECOLOGY | Age: 22
End: 2022-07-28

## 2022-07-28 NOTE — TELEPHONE ENCOUNTER
She can skip the brown pills and start a new pack, that may skip her period.  It doesn't always work . Should not count on ocps for birth control the first month.  So use a back up, condoms.

## 2022-07-28 NOTE — TELEPHONE ENCOUNTER
"Pt called stating that she started new OCP in June, she will be getting  next week and wondering if she can skip the last 4 \"brown\" pills since this causes her to have discharge when taking.   Also, she would like to know if she should \"double protect\" since she has only been taking them a month.   Please advise   Correct # in chart   "

## 2022-07-29 ENCOUNTER — TELEPHONE (OUTPATIENT)
Dept: OBSTETRICS AND GYNECOLOGY | Age: 22
End: 2022-07-29

## 2022-07-29 NOTE — TELEPHONE ENCOUNTER
Spoke with patient, relayed directions. Patient stated understanding, no other questions at this time.

## 2022-10-14 ENCOUNTER — OFFICE VISIT (OUTPATIENT)
Dept: INTERNAL MEDICINE | Facility: CLINIC | Age: 22
End: 2022-10-14

## 2022-10-14 VITALS
HEART RATE: 84 BPM | OXYGEN SATURATION: 98 % | TEMPERATURE: 98.6 F | WEIGHT: 125 LBS | BODY MASS INDEX: 19.62 KG/M2 | HEIGHT: 67 IN | SYSTOLIC BLOOD PRESSURE: 110 MMHG | DIASTOLIC BLOOD PRESSURE: 74 MMHG

## 2022-10-14 DIAGNOSIS — Z23 NEED FOR INFLUENZA VACCINATION: ICD-10-CM

## 2022-10-14 DIAGNOSIS — G43.009 MIGRAINE WITHOUT AURA AND WITHOUT STATUS MIGRAINOSUS, NOT INTRACTABLE: Primary | ICD-10-CM

## 2022-10-14 PROCEDURE — 90686 IIV4 VACC NO PRSV 0.5 ML IM: CPT | Performed by: NURSE PRACTITIONER

## 2022-10-14 PROCEDURE — 90471 IMMUNIZATION ADMIN: CPT | Performed by: NURSE PRACTITIONER

## 2022-10-14 PROCEDURE — 99213 OFFICE O/P EST LOW 20 MIN: CPT | Performed by: NURSE PRACTITIONER

## 2022-10-14 NOTE — PROGRESS NOTES
Subjective   Charmaine Barone is a 22 y.o. female.     Chief Complaint   Patient presents with   • Headache   • Migraine        History of Present Illness   She is here today with complaints of more frequent headaches over the past few months. HA occur along the bilateral temples, throbbing pain associated with photophobia, phonophobia and occasional nausea.  She denies any vomiting.  Headaches typically last several hours, affecting her daily activities.  She denies any aura.  She is having on average 1-2 headaches a week.  She has been using ibuprofen and rest with minimal improvement.  She notes HA after staring at her computer screen for long periods of time. She is UTD with the eye doctor, awaiting new eyeglasses prescription bluelight protection.  She is currently on OCP Loestrin.  She denies more frequent headaches around her menses.  She has a history of headaches but they have become more frequent and severe.  Family history of grandmother with migraines.    The following portions of the patient's history were reviewed and updated as appropriate: allergies, current medications, past family history, past medical history, past social history, past surgical history and problem list.    Review of Systems   Constitutional: Negative for chills, fatigue and fever.   Eyes: Negative.    Respiratory: Negative for cough, chest tightness, shortness of breath and wheezing.    Cardiovascular: Negative for chest pain, palpitations and leg swelling.   Gastrointestinal: Positive for nausea. Negative for vomiting.   Musculoskeletal: Negative for gait problem.   Neurological: Positive for headache. Negative for dizziness, tremors, seizures, syncope, facial asymmetry, speech difficulty, weakness, light-headedness, numbness, memory problem and confusion.       Objective   Physical Exam  Constitutional:       Appearance: She is well-developed.   Neck:      Thyroid: No thyroid mass, thyromegaly or thyroid tenderness.       Vascular: No carotid bruit.      Trachea: Trachea normal.   Cardiovascular:      Rate and Rhythm: Normal rate and regular rhythm.      Chest Wall: PMI is not displaced.      Pulses:           Radial pulses are 2+ on the right side and 2+ on the left side.        Dorsalis pedis pulses are 2+ on the right side and 2+ on the left side.        Posterior tibial pulses are 2+ on the right side and 2+ on the left side.      Heart sounds: S1 normal and S2 normal.   Pulmonary:      Effort: Pulmonary effort is normal.      Breath sounds: Normal breath sounds.   Musculoskeletal:      Right lower leg: No edema.      Left lower leg: No edema.   Lymphadenopathy:      Head:      Right side of head: No submental, submandibular, tonsillar or occipital adenopathy.      Left side of head: No submental, submandibular, tonsillar or occipital adenopathy.      Cervical: No cervical adenopathy.   Skin:     General: Skin is warm and dry.      Capillary Refill: Capillary refill takes less than 2 seconds.      Nails: There is no clubbing.   Neurological:      General: No focal deficit present.      Mental Status: She is alert and oriented to person, place, and time. Mental status is at baseline.      Cranial Nerves: Cranial nerves 2-12 are intact.      Sensory: Sensation is intact.      Motor: Motor function is intact.      Coordination: Coordination is intact.      Gait: Gait is intact.      Deep Tendon Reflexes:      Reflex Scores:       Patellar reflexes are 2+ on the right side and 2+ on the left side.  Psychiatric:         Attention and Perception: Attention normal.         Mood and Affect: Mood and affect normal.         Speech: Speech normal.         Behavior: Behavior normal.         Thought Content: Thought content normal.         Cognition and Memory: Cognition normal.         Vitals:    10/14/22 1035   BP: 110/74   Pulse: 84   Temp: 98.6 °F (37 °C)   SpO2: 98%      Body mass index is 19.57 kg/m².    Assessment & Plan   Diagnoses  and all orders for this visit:    1. Migraine without aura and without status migrainosus, not intractable (Primary)  -     Rimegepant Sulfate (NURTEC) 75 MG tablet dispersible tablet; Take 1 tablet by mouth As Needed (migraine headache). May repeat dose once after 2 hours for total of 2 tablets in 24 hours.  Dispense: 16 tablet; Refill: 2      1.  Migraine without aura- headache presentation appears to be a migraine.  We will try as needed Nurtec 75 mg.  Discussed with her appropriate use and adverse effects.  Take at the initial onset of migraine.  May repeat dose once after 2 hours.  Prescription for Nurtec 75 mg sent to specialty pharmacy today.  Discussed the importance of hydrating well with fluids, managing allergies, ensuring adequate sleep, avoiding excessive screen time.  Encouraged her to start a migraine headache diary.  Follow-up in 4 weeks.

## 2022-11-17 ENCOUNTER — OFFICE VISIT (OUTPATIENT)
Dept: INTERNAL MEDICINE | Facility: CLINIC | Age: 22
End: 2022-11-17

## 2022-11-17 VITALS
SYSTOLIC BLOOD PRESSURE: 110 MMHG | HEART RATE: 80 BPM | TEMPERATURE: 98.2 F | HEIGHT: 67 IN | OXYGEN SATURATION: 98 % | WEIGHT: 130.9 LBS | BODY MASS INDEX: 20.55 KG/M2 | DIASTOLIC BLOOD PRESSURE: 78 MMHG

## 2022-11-17 DIAGNOSIS — Z00.00 HEALTH CARE MAINTENANCE: Primary | ICD-10-CM

## 2022-11-17 DIAGNOSIS — G43.009 MIGRAINE WITHOUT AURA AND WITHOUT STATUS MIGRAINOSUS, NOT INTRACTABLE: ICD-10-CM

## 2022-11-17 PROCEDURE — 99395 PREV VISIT EST AGE 18-39: CPT | Performed by: INTERNAL MEDICINE

## 2022-11-17 RX ORDER — FEXOFENADINE HCL 180 MG/1
180 TABLET ORAL DAILY
COMMUNITY

## 2022-11-17 NOTE — PROGRESS NOTES
"Subjective   Charmaine Barone is a 22 y.o. female and is here for a comprehensive physical exam. The patient reports problems - migraine .  She does well with occas nurtec  She does use qvar    Pt is UTD with annual gyn exam and mammo utd with gyn    Do you take any herbs or supplements that were not prescribed by a doctor? none      Social History: no tob occas etoh  Social History     Socioeconomic History   • Marital status: Single   Tobacco Use   • Smoking status: Never   • Smokeless tobacco: Never   Substance and Sexual Activity   • Alcohol use: Yes     Alcohol/week: 2.0 standard drinks     Types: 1 Glasses of wine, 1 Drinks containing 0.5 oz of alcohol per week     Comment: social   • Drug use: Never   • Sexual activity: Never       Family History: No family history on file.    Past Medical History:   Past Medical History:   Diagnosis Date   • Asthma    • Migraine without aura and without status migrainosus, not intractable 10/14/2022   • PONV (postoperative nausea and vomiting)            Review of Systems    A comprehensive review of systems was negative.    Objective   /78 (BP Location: Left arm, Patient Position: Sitting)   Pulse 80   Temp 98.2 °F (36.8 °C) (Infrared)   Ht 170.2 cm (67.01\")   Wt 59.4 kg (130 lb 14.4 oz)   SpO2 98%   BMI 20.50 kg/m²     General Appearance:    Alert, cooperative, no distress, appears stated age   Head:    Normocephalic, without obvious abnormality, atraumatic   Eyes:    PERRL, conjunctiva/corneas clear, EOM's intact, fundi     benign, both eyes   Ears:    Normal TM's and external ear canals, both ears   Nose:   Nares normal, septum midline, mucosa normal, no drainage    or sinus tenderness   Throat:   Lips, mucosa, and tongue normal; teeth and gums normal   Neck:   Supple, symmetrical, trachea midline, no adenopathy;     thyroid:  no enlargement/tenderness/nodules; no carotid    bruit or JVD   Back:     Symmetric, no curvature, ROM normal, no CVA tenderness "   Lungs:     Clear to auscultation bilaterally, respirations unlabored   Chest Wall:    No tenderness or deformity    Heart:    Regular rate and rhythm, S1 and S2 normal, no murmur, rub   or gallop       Abdomen:     Soft, non-tender, bowel sounds active all four quadrants,     no masses, no organomegaly           Extremities:   Extremities normal, atraumatic, no cyanosis or edema   Pulses:   2+ and symmetric all extremities   Skin:   Skin color, texture, turgor normal, no rashes or lesions   Lymph nodes:   Cervical, supraclavicular, and axillary nodes normal   Neurologic:   CNII-XII intact, normal strength, sensation and reflexes     throughout       Vitals:    11/17/22 1455   BP: 110/78   Pulse: 80   Temp: 98.2 °F (36.8 °C)   SpO2: 98%     Body mass index is 20.5 kg/m².      Medications:   Current Outpatient Medications:   •  beclomethasone (QVAR) 80 MCG/ACT inhaler, Inhale 1 puff Daily As Needed., Disp: , Rfl:   •  fexofenadine (ALLEGRA) 180 MG tablet, Take 180 mg by mouth Daily., Disp: , Rfl:   •  hydrOXYzine (ATARAX) 10 MG tablet, Take 1 tablet by mouth 3 (Three) Times a Day As Needed for Anxiety., Disp: 30 tablet, Rfl: 0  •  levalbuterol (XOPENEX HFA) 45 MCG/ACT inhaler, Inhale 1-2 puffs Daily As Needed., Disp: , Rfl:   •  norethindrone-ethinyl estradiol-ferrous fumarate (LOESTIN 24 FE) 1-20 MG-MCG(24) per tablet, Take 1 tablet by mouth Daily., Disp: 84 tablet, Rfl: 3  •  Rimegepant Sulfate (NURTEC) 75 MG tablet dispersible tablet, Take 1 tablet by mouth As Needed (migraine headache). May repeat dose once after 2 hours for total of 2 tablets in 24 hours., Disp: 16 tablet, Rfl: 2  •  Triamcinolone Acetonide (NASACORT) 55 MCG/ACT nasal inhaler, 2 sprays into the nostril(s) as directed by provider Daily., Disp: , Rfl:        Assessment & Plan   Healthy female exam.      1. Healthcare Maintenance:  2. Patient Counseling:  --Nutrition: Stressed importance of moderation in sodium/caffeine intake, saturated fat and  cholesterol, caloric balance, sufficient intake of fresh fruits, vegetables, fiber, calcium and vit D  --Exercise: she does exercise reg  --Substance Abuse: no tob no etoh  --Dental health: she does go to the dentist  --Immunizations reviewed.utd  --Discussed benefits of screening colonoscopy.  3.  migrgarrick HA-  Ok with nurtec as needed we will continue this

## 2023-03-21 ENCOUNTER — TELEPHONE (OUTPATIENT)
Dept: INTERNAL MEDICINE | Facility: CLINIC | Age: 23
End: 2023-03-21
Payer: COMMERCIAL

## 2023-03-21 DIAGNOSIS — G43.009 MIGRAINE WITHOUT AURA AND WITHOUT STATUS MIGRAINOSUS, NOT INTRACTABLE: ICD-10-CM

## 2023-03-21 RX ORDER — RIMEGEPANT SULFATE 75 MG/75MG
TABLET, ORALLY DISINTEGRATING ORAL
Qty: 16 TABLET | Refills: 2 | Status: SHIPPED | OUTPATIENT
Start: 2023-03-21

## 2023-03-27 ENCOUNTER — OFFICE VISIT (OUTPATIENT)
Dept: INTERNAL MEDICINE | Facility: CLINIC | Age: 23
End: 2023-03-27
Payer: COMMERCIAL

## 2023-03-27 VITALS
TEMPERATURE: 97.3 F | SYSTOLIC BLOOD PRESSURE: 116 MMHG | BODY MASS INDEX: 21.58 KG/M2 | WEIGHT: 137.5 LBS | DIASTOLIC BLOOD PRESSURE: 74 MMHG | OXYGEN SATURATION: 100 % | HEIGHT: 67 IN | HEART RATE: 85 BPM

## 2023-03-27 DIAGNOSIS — Z87.440 HISTORY OF RECURRENT UTIS: Primary | ICD-10-CM

## 2023-03-27 RX ORDER — CIPROFLOXACIN 250 MG/1
250 TABLET, FILM COATED ORAL 2 TIMES DAILY
Qty: 14 TABLET | Refills: 0 | Status: SHIPPED | OUTPATIENT
Start: 2023-03-27

## 2023-03-27 RX ORDER — CEPHALEXIN 500 MG/1
CAPSULE ORAL
COMMUNITY
Start: 2022-12-04 | End: 2023-03-27

## 2023-03-27 NOTE — PROGRESS NOTES
Subjective   Charmaine Barone is a 22 y.o. female.   She is soon to travel   She does tend to get UTis with travel  So she would like an abx    History of Present Illness   No current uti sx.  Patient is actually not had a UTI in a while but she did have one last year when she went out of the country.  She is going to be going to [facility]  I have advised her to drink only bottled water but I am going to give her some Cipro to take with her    The following portions of the patient's history were reviewed and updated as appropriate: allergies, current medications, past medical history, past social history and problem list.    Review of Systems    Objective   Physical Exam  Vitals reviewed.   Constitutional:       Appearance: She is well-developed.   HENT:      Head: Normocephalic and atraumatic.      Right Ear: External ear normal.      Left Ear: External ear normal.   Eyes:      Conjunctiva/sclera: Conjunctivae normal.      Pupils: Pupils are equal, round, and reactive to light.   Neck:      Thyroid: No thyromegaly.      Trachea: No tracheal deviation.   Cardiovascular:      Rate and Rhythm: Normal rate and regular rhythm.      Heart sounds: Normal heart sounds.   Pulmonary:      Effort: Pulmonary effort is normal.      Breath sounds: Normal breath sounds.   Abdominal:      General: Bowel sounds are normal. There is no distension.      Palpations: Abdomen is soft.      Tenderness: There is no abdominal tenderness.   Musculoskeletal:         General: No deformity. Normal range of motion.      Cervical back: Normal range of motion.   Skin:     General: Skin is warm and dry.   Neurological:      Mental Status: She is alert and oriented to person, place, and time.   Psychiatric:         Behavior: Behavior normal.         Thought Content: Thought content normal.         Judgment: Judgment normal.         Vitals:    03/27/23 0847   BP: 116/74   Pulse: 85   Temp: 97.3 °F (36.3 °C)   SpO2: 100%     Body mass index is  21.54 kg/m².         Assessment & Plan   Diagnoses and all orders for this visit:    1. History of recurrent UTIs (Primary)    Other orders  -     ciprofloxacin (Cipro) 250 MG tablet; Take 1 tablet by mouth 2 (Two) Times a Day.  Dispense: 14 tablet; Refill: 0          1.  History of UTI: Since she is going out of the country I am giving her Cipro.  I did explain to her this can also use for traveler's diarrhea but to avoid to drink water out of the sink.

## 2023-03-28 RX ORDER — NORETHINDRONE ACETATE AND ETHINYL ESTRADIOL, AND FERROUS FUMARATE 1MG-20(24)
KIT ORAL
Qty: 84 TABLET | Refills: 0 | Status: SHIPPED | OUTPATIENT
Start: 2023-03-28